# Patient Record
Sex: FEMALE
[De-identification: names, ages, dates, MRNs, and addresses within clinical notes are randomized per-mention and may not be internally consistent; named-entity substitution may affect disease eponyms.]

---

## 2018-10-24 ENCOUNTER — HOSPITAL ENCOUNTER (EMERGENCY)
Dept: HOSPITAL 14 - H.ER | Age: 48
Discharge: HOME | End: 2018-10-24
Payer: COMMERCIAL

## 2018-10-24 VITALS — OXYGEN SATURATION: 98 % | TEMPERATURE: 97.9 F

## 2018-10-24 VITALS — SYSTOLIC BLOOD PRESSURE: 118 MMHG | DIASTOLIC BLOOD PRESSURE: 90 MMHG | HEART RATE: 70 BPM | RESPIRATION RATE: 17 BRPM

## 2018-10-24 DIAGNOSIS — M19.071: Primary | ICD-10-CM

## 2018-10-24 DIAGNOSIS — M19.021: ICD-10-CM

## 2018-10-24 LAB
BASOPHILS # BLD AUTO: 0 K/UL (ref 0–0.2)
BASOPHILS NFR BLD: 0.4 % (ref 0–2)
BUN SERPL-MCNC: 17 MG/DL (ref 7–17)
CALCIUM SERPL-MCNC: 8.9 MG/DL (ref 8.4–10.2)
EOSINOPHIL # BLD AUTO: 0.2 K/UL (ref 0–0.7)
EOSINOPHIL NFR BLD: 2.1 % (ref 0–4)
ERYTHROCYTE [DISTWIDTH] IN BLOOD BY AUTOMATED COUNT: 16.7 % (ref 11.5–14.5)
GFR NON-AFRICAN AMERICAN: > 60
HGB BLD-MCNC: 9.9 G/DL (ref 12–16)
LYMPHOCYTES # BLD AUTO: 1.4 K/UL (ref 1–4.3)
LYMPHOCYTES NFR BLD AUTO: 14.1 % (ref 20–40)
MCH RBC QN AUTO: 22 PG (ref 27–31)
MCHC RBC AUTO-ENTMCNC: 32 G/DL (ref 33–37)
MCV RBC AUTO: 68.9 FL (ref 81–99)
MONOCYTES # BLD: 0.9 K/UL (ref 0–0.8)
MONOCYTES NFR BLD: 9.3 % (ref 0–10)
NEUTROPHILS # BLD: 7.5 K/UL (ref 1.8–7)
NEUTROPHILS NFR BLD AUTO: 74.1 % (ref 50–75)
NRBC BLD AUTO-RTO: 0.1 % (ref 0–0)
PLATELET # BLD: 273 K/UL (ref 130–400)
PMV BLD AUTO: 8 FL (ref 7.2–11.7)
RBC # BLD AUTO: 4.5 MIL/UL (ref 3.8–5.2)
WBC # BLD AUTO: 10.1 K/UL (ref 4.8–10.8)

## 2018-10-24 PROCEDURE — 73080 X-RAY EXAM OF ELBOW: CPT

## 2018-10-24 PROCEDURE — 99283 EMERGENCY DEPT VISIT LOW MDM: CPT

## 2018-10-24 PROCEDURE — 80048 BASIC METABOLIC PNL TOTAL CA: CPT

## 2018-10-24 PROCEDURE — 85025 COMPLETE CBC W/AUTO DIFF WBC: CPT

## 2018-10-24 PROCEDURE — 81025 URINE PREGNANCY TEST: CPT

## 2018-10-24 NOTE — ED PDOC
Upper Extremity Pain/Injury


Time Seen by Provider: 10/24/18 07:07


Chief Complaint (Nursing): Upper Extremity Problem/Injury


History Per: Patient (This  49 yo female states onset of severe right elbow pain

last night while she was doing dishes as usual. Patient denies fall, blunt 

injury, overexertion or other injuries. She denies other medical problems. She 

took ibuprofen 400mg last night without significant relief.)


History/Exam Limitations: no limitations





Past Medical History


Reviewed: Historical Data, Nursing Documentation, Vital Signs


Vital Signs: 


                                Last Vital Signs











Temp  97.9 F   10/24/18 06:49


 


Pulse  70   10/24/18 11:30


 


Resp  17   10/24/18 11:30


 


BP  118/90   10/24/18 11:30


 


Pulse Ox  98   10/24/18 11:30














- Medical History


PMH: Anemia





- Surgical History


Surgical History: No Surg Hx





- Family History


Family History: States: No Known Family Hx





- Living Arrangements


Living Arrangements: With Family





- Social History


Current smoker - smoking cessation education provided: No





- Home Medications


Home Medications: 


                                Ambulatory Orders











 Medication  Instructions  Recorded


 


Naproxen [Naprosyn] 500 mg PO BID PRN #20 tablet 10/24/18














- Allergies


Allergies/Adverse Reactions: 


                                    Allergies











Allergy/AdvReac Type Severity Reaction Status Date / Time


 


pollen extracts Allergy  CONGESTION Verified 10/24/18 06:48














Review of Systems


ROS Statement: Except As Marked, All Systems Reviewed And Found Negative


Constitutional: Negative for: Fever, Chills


Cardiovascular: Negative for: Chest Pain


Respiratory: Negative for: Shortness of Breath


Gastrointestinal: Negative for: Nausea, Vomiting


Genitourinary Female: Negative for: Dysuria





Physical Exam





- Reviewed


Nursing Documentation Reviewed: Yes


Vital Signs Reviewed: Yes





- Physical Exam


Appears: Positive for: Well, No Acute Distress


Head Exam: Positive for: ATRAUMATIC, NORMAL INSPECTION, NORMOCEPHALIC


Skin: Positive for: Normal Color, Warm, DRY


Eye Exam: Positive for: Normal appearance


ENT: Positive for: Normal ENT Inspection


Neck: Positive for: Normal


Cardiovascular/Chest: Positive for: Regular Rate, Rhythm


Respiratory: Positive for: CNT, Normal Breath Sounds


Gastrointestinal/Abdominal: Positive for: Normal Exam, Soft


Back: Positive for: Normal Inspection


Extremity: Positive for: Normal ROM, Tenderness (along the elbow joint, 

especially along the lateral and and medial epicondyles.)


Neurologic/Psych: Positive for: Alert, Oriented





- Laboratory Results


Result Diagrams: 


                                 10/24/18 08:10





                                 10/24/18 08:10





- ECG


O2 Sat by Pulse Oximetry: 98





Disposition





- Clinical Impression


Clinical Impression: 


 Osteoarthrosis of right elbow








- Patient ED Disposition


Is Patient to be Admitted: No


Doctor Will See Patient In The: Office


Counseled Patient/Family Regarding: Diagnosis, Need For Followup, Rx Given





- Disposition


Referrals: 


[facility] [Outside]


Disposition: Routine/Home


Disposition Time: 10:45


Condition: IMPROVED


Prescriptions: 


Naproxen [Naprosyn] 500 mg PO BID PRN #20 tablet


 PRN Reason: Pain, Moderate (4-7)


Instructions:  Osteoarthritis


Forms:  [employer] [employer] [facility] (English), [facility] ED School/Work Excuse


Print Language: Wolof





- POA


Present On Arrival: None

## 2018-10-24 NOTE — ED PDOC
Upper Extremity Pain/Injury


Time Seen by Provider: 10/24/18 07:07


Chief Complaint (Nursing): Upper Extremity Problem/Injury





Past Medical History


Vital Signs: 


                                Last Vital Signs











Temp  97.9 F   10/24/18 06:49


 


Pulse  67   10/24/18 06:49


 


Resp  18   10/24/18 06:49


 


BP  121/78   10/24/18 06:49


 


Pulse Ox  98   10/24/18 06:49














- Family History


Family History: States: No Known Family Hx





- Home Medications


Home Medications: 


                                Ambulatory Orders











 Medication  Instructions  Recorded


 


Naproxen [Naprosyn] 500 mg PO BID PRN #20 tablet 10/24/18














- Allergies


Allergies/Adverse Reactions: 


                                    Allergies











Allergy/AdvReac Type Severity Reaction Status Date / Time


 


pollen extracts Allergy  CONGESTION Verified 10/24/18 06:48














- Laboratory Results


Result Diagrams: 


                                 10/24/18 08:10





                                 10/24/18 08:10





- ECG


O2 Sat by Pulse Oximetry: 98





Disposition





- Clinical Impression


Clinical Impression: 


 Osteoarthrosis of right elbow








- Patient ED Disposition


Is Patient to be Admitted: No


Doctor Will See Patient In The: Office


Counseled Patient/Family Regarding: Diagnosis, Need For Followup, Rx Given





- Disposition


Referrals: 


[facility] [Outside]


Disposition: Routine/Home


Disposition Time: 10:45


Condition: IMPROVED


Prescriptions: 


Naproxen [Naprosyn] 500 mg PO BID PRN #20 tablet


 PRN Reason: Pain, Moderate (4-7)


Instructions:  Osteoarthritis


Forms:  [employer] [facility] (English), [facility] ED School/Work Excuse


Print Language: Kazakh





- POA


Present On Arrival: None

## 2018-10-24 NOTE — RAD
Date of service: 



10/24/2018



PROCEDURE:  Radiographs of the right elbow.



HISTORY:

 sudden onset of pain last night. no injury. 



COMPARISON:

No prior.



FINDINGS:



BONES:

Normal. No fracture.



JOINTS:

Trace osteoarthritis ulnar coronoid  spurring.



SOFT TISSUES:

Normal.



JOINT EFFUSION:

None.



OTHER FINDINGS:

None.



IMPRESSION:

Trace osteoarthrosis-ulnar coronoid spurring.  No fracture or lytic 

lesion.

## 2018-12-22 ENCOUNTER — HOSPITAL ENCOUNTER (EMERGENCY)
Dept: HOSPITAL 14 - H.ER | Age: 48
Discharge: HOME | End: 2018-12-22
Payer: SELF-PAY

## 2018-12-22 VITALS
DIASTOLIC BLOOD PRESSURE: 82 MMHG | OXYGEN SATURATION: 96 % | RESPIRATION RATE: 17 BRPM | SYSTOLIC BLOOD PRESSURE: 130 MMHG | TEMPERATURE: 99.4 F | HEART RATE: 90 BPM

## 2018-12-22 DIAGNOSIS — M75.01: Primary | ICD-10-CM

## 2018-12-22 PROCEDURE — 99284 EMERGENCY DEPT VISIT MOD MDM: CPT

## 2018-12-22 PROCEDURE — 96372 THER/PROPH/DIAG INJ SC/IM: CPT

## 2018-12-22 PROCEDURE — 81025 URINE PREGNANCY TEST: CPT

## 2018-12-22 PROCEDURE — 73030 X-RAY EXAM OF SHOULDER: CPT

## 2018-12-22 NOTE — ED PDOC
Upper Extremity Pain/Injury


Time Seen by Provider: 12/22/18 02:30


Chief Complaint (Nursing): Upper Extremity Problem/Injury


Chief Complaint (Provider): Upper Extremity Problem/Injury


History Per: Patient


History/Exam Limitations: no limitations


Onset/Duration Of Symptoms: Days (x1)


Current Symptoms Are (Timing): Still Present


Additional Complaint(s): 


48 year old  female presents to ED with right shoulder pain that 

exacerbates with movement status post house cleaning. Patient states she had 

similar symptoms in the past then diagnosed with arthritis at the time. She took

Tylenol with minimal relief. Otherwise, patient denies any falls, blunt trauma 

or injury to affected area. 





PCP: [facility]





Past Medical History


Reviewed: Historical Data, Nursing Documentation, Vital Signs


Vital Signs: 


                                Last Vital Signs











Temp  99.4 F   12/22/18 02:06


 


Pulse  90   12/22/18 02:06


 


Resp  17   12/22/18 02:06


 


BP  130/82   12/22/18 02:06


 


Pulse Ox  96   12/22/18 02:06














- Medical History


PMH: Anemia





- Family History


Family History: States: Unknown Family Hx





- Home Medications


Home Medications: 


                                Ambulatory Orders











 Medication  Instructions  Recorded


 


Naproxen [Naprosyn] 500 mg PO BID PRN #20 tablet 10/24/18


 


Naproxen [Naprosyn] 500 mg PO Q12 #14 tab 12/22/18














- Allergies


Allergies/Adverse Reactions: 


                                    Allergies











Allergy/AdvReac Type Severity Reaction Status Date / Time


 


pollen extracts Allergy  CONGESTION Verified 10/24/18 06:48














Review of Systems


ROS Statement: Except As Marked, All Systems Reviewed And Found Negative


Musculoskeletal: Positive for: Shoulder Pain (right-sided).  Negative for: Other

 (blunt trauma or injury)





Physical Exam





- Reviewed


Nursing Documentation Reviewed: Yes


Vital Signs Reviewed: Yes





- Physical Exam


Appears: Positive for: Non-toxic, No Acute Distress


Extremity: Positive for: Tenderness (over right bicep tendon and AC joint with 

passive ROM).  Negative for: Deformity (or warmth/erythema of right shoulder)


Neurologic/Psych: Positive for: Alert, Oriented.  Negative for: Motor/Sensory 

Deficits





- Laboratory Results


Urine Pregnancy POC: Negative





- ECG


O2 Sat by Pulse Oximetry: 96 (RA)


Pulse Ox Interpretation: Normal





Medical Decision Making


Medical Decision Making: 


Initial Impression: 48 year old  female with frozen shoulder.


Initial Plan:


* Urine pregnancy


* Toradol 60mg IM


* XR shoulder (right)





Time: 0323


--XR right shoulder interpreted by provider: (-) dislocation or fracture.





Time: 0415


--Upon provider reevaluation, patient is medically stable and requires no 

further treatment in the ED at this time. Patient will be discharged home with 

Rx for Naprosyn. Counseling was provided and all questions were answered 

regarding diagnosis. There is agreement to discharge plan. Return if symptoms 

persist or worsen.





Clinical Impression: Frozen shoulder





 

--------------------------------------------------------------------------------


-----------------


Scribe Attestation:


Documented by Melisa Olea, acting as a scribe for Javed Fletcher MD.


Provider Scribe Attestation:


All medical record entries made by the Scribe were at my direction and 

personally dictated by me. I have reviewed the chart and agree that the record 

accurately reflects my personal performance of the history, physical exam, 

medical decision making, and the department course for this patient. I have also

 personally directed, reviewed, and agree with the discharge instructions and 

disposition.





Disposition





- Clinical Impression


Clinical Impression: 


 Frozen shoulder








- Patient ED Disposition


Is Patient to be Admitted: No


Counseled Patient/Family Regarding: Studies Performed, Diagnosis, Rx Given





- Disposition


Disposition: Routine/Home


Disposition Time: 04:15


Condition: STABLE


Prescriptions: 


Naproxen [Naprosyn] 500 mg PO Q12 #14 tab


Instructions:  Frozen Shoulder, Frozen Shoulder Exercises


Forms:  [employer] (English)


Print Language: Singaporean

## 2018-12-22 NOTE — RAD
Date of service: 



12/22/2018



PROCEDURE:  Radiographs of the Right Shoulder



HISTORY:

r/o fracture







COMPARISON:

No prior.



FINDINGS:



BONES:

Normal. No fracture.



JOINTS:

Glenohumeral and acromioclavicular joints preserved. No significant 

osteoarthritis.



SOFT TISSUES:

Normal.



OTHER FINDINGS:

None. 



IMPRESSION:

No evidence of acute displaced fracture nor dislocation

## 2019-01-13 ENCOUNTER — HOSPITAL ENCOUNTER (EMERGENCY)
Dept: HOSPITAL 14 - H.ER | Age: 49
LOS: 1 days | Discharge: HOME | End: 2019-01-14
Payer: SELF-PAY

## 2019-01-13 VITALS — RESPIRATION RATE: 18 BRPM

## 2019-01-13 DIAGNOSIS — M25.511: ICD-10-CM

## 2019-01-13 DIAGNOSIS — R22.31: Primary | ICD-10-CM

## 2019-01-13 LAB
BASOPHILS # BLD AUTO: 0 K/UL (ref 0–0.2)
BASOPHILS NFR BLD: 0.6 % (ref 0–2)
BUN SERPL-MCNC: 16 MG/DL (ref 7–17)
CALCIUM SERPL-MCNC: 8.8 MG/DL (ref 8.4–10.2)
EOSINOPHIL # BLD AUTO: 0.2 K/UL (ref 0–0.7)
EOSINOPHIL NFR BLD: 2.4 % (ref 0–4)
ERYTHROCYTE [DISTWIDTH] IN BLOOD BY AUTOMATED COUNT: 18.7 % (ref 11.5–14.5)
GFR NON-AFRICAN AMERICAN: > 60
HGB BLD-MCNC: 9.7 G/DL (ref 12–16)
LYMPHOCYTES # BLD AUTO: 1.9 K/UL (ref 1–4.3)
LYMPHOCYTES NFR BLD AUTO: 29.4 % (ref 20–40)
MCH RBC QN AUTO: 21.5 PG (ref 27–31)
MCHC RBC AUTO-ENTMCNC: 31.6 G/DL (ref 33–37)
MCV RBC AUTO: 68.2 FL (ref 81–99)
MONOCYTES # BLD: 0.6 K/UL (ref 0–0.8)
MONOCYTES NFR BLD: 9.4 % (ref 0–10)
NEUTROPHILS # BLD: 3.8 K/UL (ref 1.8–7)
NEUTROPHILS NFR BLD AUTO: 58.2 % (ref 50–75)
NRBC BLD AUTO-RTO: 0 % (ref 0–0)
PLATELET # BLD: 241 K/UL (ref 130–400)
PMV BLD AUTO: 8.3 FL (ref 7.2–11.7)
RBC # BLD AUTO: 4.51 MIL/UL (ref 3.8–5.2)
WBC # BLD AUTO: 6.5 K/UL (ref 4.8–10.8)

## 2019-01-13 PROCEDURE — 93971 EXTREMITY STUDY: CPT

## 2019-01-13 PROCEDURE — 99283 EMERGENCY DEPT VISIT LOW MDM: CPT

## 2019-01-13 PROCEDURE — 80048 BASIC METABOLIC PNL TOTAL CA: CPT

## 2019-01-13 PROCEDURE — 85025 COMPLETE CBC W/AUTO DIFF WBC: CPT

## 2019-01-13 PROCEDURE — 71275 CT ANGIOGRAPHY CHEST: CPT

## 2019-01-13 PROCEDURE — 81025 URINE PREGNANCY TEST: CPT

## 2019-01-13 PROCEDURE — 85378 FIBRIN DEGRADE SEMIQUANT: CPT

## 2019-01-13 NOTE — ED PDOC
Upper Extremity Pain/Injury


Time Seen by Provider: 01/13/19 20:55


Chief Complaint (Nursing): Finger,Hand,&Wrist


Chief Complaint (Provider): Upper Extremity Pain/Injury


History Per: Patient


History/Exam Limitations: no limitations


Onset/Duration Of Symptoms: Days (x5)


Current Symptoms Are (Timing): Still Present


Additional Complaint(s): 


49 y/o female with no significant PMHx presents to the ED for evaluation of 

right arm and hand swelling and pain, onset 5 days ago. Patient states she has 

began noticing pain and swelling has been increasingly worsening over the last 

five days thus prompting today's visit. Patient reports of taking Tylenol with 

minimal improvement. Patient states last dose of Tylenol was at 5:00 PM. 

Otherwise, patient denies trauma, fall, shortness of breath and any history of 

Hypercoagulability, DVT or PE in the past. 








PMD: Renae Cervantes





Past Medical History


Reviewed: Historical Data, Nursing Documentation, Vital Signs


Vital Signs: 





                                Last Vital Signs











Temp  97.9 F   01/13/19 20:49


 


Pulse  66   01/13/19 20:49


 


Resp  18   01/13/19 20:49


 


BP  123/76   01/13/19 20:49


 


Pulse Ox  99   01/13/19 20:49














- Medical History


PMH: Anemia





- Surgical History


Surgical History: No Surg Hx





- Family History


Family History: States: Unknown Family Hx





- Home Medications


Home Medications: 


                                Ambulatory Orders











 Medication  Instructions  Recorded


 


Naproxen [Naprosyn] 500 mg PO BID PRN #20 tablet 10/24/18


 


Naproxen [Naprosyn] 500 mg PO Q12 #14 tab 12/22/18














- Allergies


Allergies/Adverse Reactions: 


                                    Allergies











Allergy/AdvReac Type Severity Reaction Status Date / Time


 


pollen extracts Allergy  CONGESTION Verified 10/24/18 06:48














Review of Systems


ROS Statement: Except As Marked, All Systems Reviewed And Found Negative


Cardiovascular: Negative for: Chest Pain


Respiratory: Negative for: Shortness of Breath


Musculoskeletal: Positive for: Arm Pain (right), Hand Pain (right)





Physical Exam





- Reviewed


Nursing Documentation Reviewed: Yes


Vital Signs Reviewed: Yes





- Physical Exam


Appears: Positive for: No Acute Distress


Cardiovascular/Chest: Positive for: Regular Rate, Rhythm.  Negative for: Murmur


Respiratory: Positive for: Normal Breath Sounds.  Negative for: Respiratory 

Distress


Pulses-Radial (L): 2+


Pulses-Radial (R): 2+


Extremity: Positive for: Capillary Refill (< 3 seconds), Swelling (from the 

right mid-forearm to the hand.).  Negative for: Normal ROM (Decreased flexion of

 all five digits. Mildly decreased flexion at the wrists), Tenderness (on the 

forearm or hand), Deformity, Other (ecchymosis, erythema )


Neurologic/Psych: Positive for: Alert, Oriented.  Negative for: Motor/Sensory 

Deficits





- Laboratory Results


Result Diagrams: 


                                 01/13/19 21:33





                                 01/13/19 21:33





- ECG


O2 Sat by Pulse Oximetry: 99 (RA)


Pulse Ox Interpretation: Normal





Medical Decision Making


Medical Decision Making: 


Time: 2125


Plan:


-- BMP


-- CBC with Differentials


-- D Dimer


-- Duplex Upper Extremity Vein Right US 





Right Upper extremity venous duplex: No evidence of DVT. 


D-dimer: 683





Given significantly elevated D-dimer, pt to be ruled out for PE. 





Pt endorsed to ANNELISE Gomez pending chest CTA and re-evaluation. 





 

________________________________________________________________________________


___


Scribe Attestation:


Documented by Casie Arciniega, acting as a scribe Angeles Cooney PA-C.





Provider Scribe Attestation:


All medical record entries made by the Scribe were at my direction and 

personally dictated by me. I have reviewed the chart and agree that the record 

accurately reflects my personal performance of the history, physical exam, 

medical decision making, and the department course for this patient. I have also

 personally directed, reviewed, and agree with the discharge instructions and 

disposition.





Disposition





- Clinical Impression


Clinical Impression: 


 Swelling of right hand








- Patient ED Disposition


Is Patient to be Admitted: Transfer of Care





- Disposition


Disposition: Transfer of Care (ANNELISE Gomez)


Disposition Time: 00:30


Condition: FAIR


Forms:  [employer] (English)

## 2019-01-14 VITALS
TEMPERATURE: 98.6 F | DIASTOLIC BLOOD PRESSURE: 81 MMHG | HEART RATE: 65 BPM | SYSTOLIC BLOOD PRESSURE: 126 MMHG | OXYGEN SATURATION: 98 %

## 2019-01-14 NOTE — CT
Date of service: 



01/14/2019



PROCEDURE:  CT Chest with contrast (Pulmonary Angiogram)



HISTORY:

elevated D-dimer, Right arm swelling



COMPARISON:

None available.



TECHNIQUE:

Axial computed tomography images were obtained of the chest in the 

pulmonary arterial phase of enhancement. Coronal and sagittal 

reformatted images were created and reviewed.



Intravenous contrast dose: Visipaque 320, 95 cc



Radiation dose:



Total exam DLP = 321.42 mGy-cm.



This CT exam was performed using one or more of the following dose 

reduction techniques: Automated exposure control, adjustment of the 

mA and/or kV according to patient size, and/or use of iterative 

reconstruction technique.



FINDINGS:



PULMONARY ARTERIES:

Unremarkable. No pulmonary embolism. 



AORTA:

No acute findings. No thoracic aortic aneurysm. No aortic 

atherosclerotic calcification or mural plaque present.



LUNGS:

Limited ground-glass opacity bilateral bases, minimal.  No nodule, 

mass or pulmonary consolidation. 



PLEURAL SPACES:

Unremarkable. No effusion or pneumothorax. 



HEART:

Mild cardiomegaly.  No pulmonary vascular congestion.  No significant 

pericardial effusion. 



LYMPH NODES:

No lymphadenopathy.



BONES, CHEST WALL:

Unremarkable. No fracture or destructive lesion 



OTHER FINDINGS:

Unremarkable. 



IMPRESSION:

Unremarkable CT pulmonary angiogram. No pulmonary embolus.  Minimal 

ground-glass opacity bilateral lower lobe bases.  Mild cardiomegaly.  

No pulmonary vascular congestion evident.



[facility]cord[facility] preliminary report from [facility], 01/14/2019 2:50 a.m..

## 2019-01-14 NOTE — US
Date of service: 



01/13/2019



PROCEDURE:  RIGHT UPPER EXTREMITY VENOUS DOPPLER ULTRASOUND



HISTORY:

unilateral Right arm and hand swelling x 5 days



COMPARISON:

None available.



TECHNIQUE:

Duplex Doppler ultrasonography of the major deep veins of the right 

upper extremity is been performed including graded compression and 

augmentation where possible.



FINDINGS:

Good compressibility and augmentation are identified at the right 

subclavian, axillary, brachial and cephalic as well as basilic veins. 

 Robust color Doppler blood flow is seen the right internal jugular 

vein which compresses well.  Normal phasic spectral patterns are 

appreciate throughout the veins of the right upper extremity further. 



IMPRESSION:

No sonographic evidence to suggest deep venous thrombosis right upper 

extremity.



[facility] preliminary report from [facility], 01/14/2019 12:31 a.m..

## 2019-01-14 NOTE — ED PDOC
- Laboratory Results


Result Diagrams: 


                                 01/13/19 21:33





                                 01/13/19 21:33


Lab Results: 





                                        











D-Dimer, Quantitative  685 ng/mlDDU (0-230)  H  01/13/19  21:33    














- ECG


O2 Sat by Pulse Oximetry: 99 (RA)


Pulse Ox Interpretation: Normal





Medical Decision Making


Medical Decision Making: 





Case endorsed to writer, Patricia MONTANO, at 0015. Pertinent details reviewed. 

Patient pending CTA evaluation to r/o PE. Labs reviewed, significant elevation 

of D-Dimer. Upper extremity U/S: no evidence of thrombosis as read per [facility].





0155


Patient in CT.





0250


CT Angio Chest FINDINGS:  


Minimal groundglass densities of the left lower lobe.


Normal enhancement of the main pulmonary artery and right and left pulmonary 

arteries.  Normal enhancement of the bilateral peripheral pulmonary arteries.  

There is no demonstrated pulmonary embolism.     


Normal thoracic aorta and visualized great vessels.  


There is no demonstrated aortic dissection.


Normal heart and pericardium.      


Normal mediastinum.  Normal hilar regions. 


Normal visualized trachea and bronchi.  The lungs are well expanded. 


Normal pulmonary parenchyma.  


Normal pleura.      


Normal chest wall structures.      


Normal osseous structures.


Normal visualized upper abdomen.  


IMPRESSION:


No demonstrated pulmonary embolism or arterial dissection.


Minimal groundglass densities of the left lower lobe. Subsegmental atelectasis 

versus developing pneumonia.





0300


Case discussed with ED MD Abraham who states patient is appropriate for discharge 

and outpatient follow up with PMD.


On re-evaluation, patient requesting additional pain medication before 

discharge. Tylenol 975mg PO ordered. Vitals stable. 


Lab/Diagnostic results d/w the patient in great detail. Diagnosis of vascular 

congestion of upper extremity d/w the patient. 


Based on history, exam and diagnostic results, plan will be for outpatient 

follow up. 


Patient instructed to follow-up with pmd / referral provided / the clinic  in 1-

2 days without fail. Advised to take medication as prescribed. Return to the 

emergency room at any time for any new or worsening symptoms. Patient states she

fully agrees with and understands discharge instructions. States that she agrees

with the plan and disposition. Verbalized and repeated discharge instructions 

and plan. I have given the patient opportunity to ask any additional questions.





Disposition


Counseled Patient/Family Regarding: Studies Performed, Diagnosis, Need For 

Followup, Rx Given





- Clinical Impression


Clinical Impression: 


 Swelling of right hand, Swelling of upper arm








- POA


Present On Arrival: None





- Disposition


Referrals: 


[facility] [Outside]


Disposition: Routine/Home


Disposition Time: 03:00


Condition: FAIR


Additional Instructions: 


La atencin mdica de emergencia que recibi hoy se dirigi a jessie sntomas 

agudos. Si le recetaron algn medicamento, llnelo y tmelo segn las 

indicaciones. Los sntomas pueden tardar varios martins en resolverse. Regrese al 

Departamento de Emergencias si jessie sntomas empeoran, no mejoran o si tiene 

otros problemas.





Comunquese con solano mdico dentro de 2 martins para gloria nueva evaluacin y adrianna un 

seguimiento o llame a ileana de los mdicos / clnicas a los que ha sido referido y

 que figuran en el formulario de Informacin de visita al paciente que se 

incluye en solano paquete de jeff. Lleve todos los documentos que le entregaron al 

momento del jeff junto con todos los medicamentos que est tomando para solano 

visita de seguimiento. Nuestro tratamiento no puede reemplazar la atencin 

mdica continua por parte de un proveedor de atencin primaria (PCP) fuera del 

departamento de emergencias.


Prescriptions: 


Acetaminophen [Acetaminophen 8 Hour] 650 mg PO Q8 PRN #21 tablet.er


 PRN Reason: Pain, Moderate (4-7)


Meloxicam [Mobic] 15 mg PO DAILY #10 tab


Instructions:  Dependent Edema (DC), Swelling


Forms:  [employer] (K[employer])


Print Language: Pakistani





Results





- Lab Results


Lab Results: 

















  01/13/19 01/13/19 01/13/19





  21:33 21:33 21:33


 


WBC   6.5 


 


RBC   4.51 


 


Hgb   9.7 L 


 


Hct   30.7 L 


 


MCV   68.2 L 


 


MCH   21.5 L 


 


MCHC   31.6 L 


 


RDW   18.7 H 


 


Plt Count   241 


 


MPV   8.3 


 


Neut % (Auto)   58.2 


 


Lymph % (Auto)   29.4 


 


Mono % (Auto)   9.4 


 


Eos % (Auto)   2.4 


 


Baso % (Auto)   0.6 


 


Neut # (Auto)   3.8 


 


Lymph # (Auto)   1.9 


 


Mono # (Auto)   0.6 


 


Eos # (Auto)   0.2 


 


Baso # (Auto)   0.0 


 


D-Dimer, Quantitative    685 H


 


Sodium  139  


 


Potassium  3.8  


 


Chloride  104  


 


Carbon Dioxide  28  


 


Anion Gap  11  


 


BUN  16  


 


Creatinine  0.7  


 


Est GFR ( Amer)  > 60  


 


Est GFR (Non-Af Amer)  > 60  


 


Random Glucose  102  


 


Calcium  8.8

## 2019-01-27 ENCOUNTER — HOSPITAL ENCOUNTER (EMERGENCY)
Dept: HOSPITAL 14 - H.ER | Age: 49
Discharge: HOME | End: 2019-01-27
Payer: SELF-PAY

## 2019-01-27 VITALS
RESPIRATION RATE: 18 BRPM | OXYGEN SATURATION: 99 % | SYSTOLIC BLOOD PRESSURE: 136 MMHG | HEART RATE: 91 BPM | DIASTOLIC BLOOD PRESSURE: 76 MMHG | TEMPERATURE: 98.5 F

## 2019-01-27 DIAGNOSIS — R10.12: ICD-10-CM

## 2019-01-27 DIAGNOSIS — M54.2: ICD-10-CM

## 2019-01-27 DIAGNOSIS — M25.512: Primary | ICD-10-CM

## 2019-01-27 LAB
ALBUMIN SERPL-MCNC: 4.3 G/DL (ref 3.5–5)
ALBUMIN/GLOB SERPL: 1.2 {RATIO} (ref 1–2.1)
ALT SERPL-CCNC: 26 U/L (ref 9–52)
AST SERPL-CCNC: 29 U/L (ref 14–36)
BASOPHILS # BLD AUTO: 0.1 K/UL (ref 0–0.2)
BASOPHILS NFR BLD: 0.6 % (ref 0–2)
BUN SERPL-MCNC: 12 MG/DL (ref 7–17)
CALCIUM SERPL-MCNC: 8.9 MG/DL (ref 8.4–10.2)
EOSINOPHIL # BLD AUTO: 0.1 K/UL (ref 0–0.7)
EOSINOPHIL NFR BLD: 0.6 % (ref 0–4)
ERYTHROCYTE [DISTWIDTH] IN BLOOD BY AUTOMATED COUNT: 19.6 % (ref 11.5–14.5)
GFR NON-AFRICAN AMERICAN: > 60
HGB BLD-MCNC: 10.2 G/DL (ref 12–16)
LIPASE SERPL-CCNC: 62 U/L (ref 23–300)
LYMPHOCYTES # BLD AUTO: 1.3 K/UL (ref 1–4.3)
LYMPHOCYTES NFR BLD AUTO: 12.9 % (ref 20–40)
MCH RBC QN AUTO: 21.6 PG (ref 27–31)
MCHC RBC AUTO-ENTMCNC: 31.6 G/DL (ref 33–37)
MCV RBC AUTO: 68.3 FL (ref 81–99)
MONOCYTES # BLD: 0.8 K/UL (ref 0–0.8)
MONOCYTES NFR BLD: 8.3 % (ref 0–10)
NEUTROPHILS # BLD: 7.7 K/UL (ref 1.8–7)
NEUTROPHILS NFR BLD AUTO: 77.6 % (ref 50–75)
NRBC BLD AUTO-RTO: 0 % (ref 0–0)
PLATELET # BLD: 247 K/UL (ref 130–400)
PMV BLD AUTO: 7.8 FL (ref 7.2–11.7)
RBC # BLD AUTO: 4.73 MIL/UL (ref 3.8–5.2)
WBC # BLD AUTO: 9.9 K/UL (ref 4.8–10.8)

## 2019-01-27 PROCEDURE — 83690 ASSAY OF LIPASE: CPT

## 2019-01-27 PROCEDURE — 93005 ELECTROCARDIOGRAM TRACING: CPT

## 2019-01-27 PROCEDURE — 85025 COMPLETE CBC W/AUTO DIFF WBC: CPT

## 2019-01-27 PROCEDURE — 81025 URINE PREGNANCY TEST: CPT

## 2019-01-27 PROCEDURE — 80053 COMPREHEN METABOLIC PANEL: CPT

## 2019-01-27 PROCEDURE — 96374 THER/PROPH/DIAG INJ IV PUSH: CPT

## 2019-01-27 PROCEDURE — 99282 EMERGENCY DEPT VISIT SF MDM: CPT

## 2019-01-27 PROCEDURE — 84484 ASSAY OF TROPONIN QUANT: CPT

## 2019-01-27 NOTE — ED PDOC
Upper Extremity Pain/Injury


Time Seen by Provider: 01/27/19 04:46


Chief Complaint (Nursing): Upper Extremity Problem/Injury


Chief Complaint (Provider): Upper Extremity Problem/Injury


History Per: Patient


History/Exam Limitations: no limitations


Onset/Duration Of Symptoms: Days (x2 days)


Additional Complaint(s): 





Sarahi Farmer is a 48 year old female with no past medical history, who presents to

the emergency department complaining of having shoulder and neck pain, 

associated with LUQ abdominal pain and vomiting, onset x2 days. Patient states 

that her neck and shoulder pain is worse with movement. She denies having any 

fever, diarrhea, chest pain or difficulty breathing. 





PMD: Dr. cisneros in [facility]











Past Medical History


Reviewed: Historical Data, Nursing Documentation, Vital Signs


Vital Signs: 





                                Last Vital Signs











Temp  98.5 F   01/27/19 03:58


 


Pulse  91 H  01/27/19 03:58


 


Resp  18   01/27/19 03:58


 


BP  136/76   01/27/19 03:58


 


Pulse Ox  99   01/27/19 03:58














- Medical History


PMH: Anemia





- Surgical History


Other surgeries: tubal ligation





- Family History


Family History: States: Unknown Family Hx





- Home Medications


Home Medications: 


                                Ambulatory Orders











 Medication  Instructions  Recorded


 


Naproxen [Naprosyn] 500 mg PO BID PRN #20 tablet 10/24/18


 


Acetaminophen [Acetaminophen 8 650 mg PO Q8 PRN #21 tablet.er 01/14/19





Hour]  


 


Meloxicam [Mobic] 15 mg PO DAILY #10 tab 01/14/19


 


Cyclobenzaprine [Cyclobenzaprine 10 mg PO TID PRN #15 tab 01/27/19





HCl]  


 


Naproxen [Naprosyn] 500 mg PO Q12 #20 tab 01/27/19














- Allergies


Allergies/Adverse Reactions: 


                                    Allergies











Allergy/AdvReac Type Severity Reaction Status Date / Time


 


pollen extracts Allergy  CONGESTION Verified 01/27/19 04:32














Review of Systems


ROS Statement: Except As Marked, All Systems Reviewed And Found Negative


Constitutional: Negative for: Fever


Cardiovascular: Negative for: Chest Pain


Respiratory: Negative for: Shortness of Breath


Gastrointestinal: Positive for: Vomiting, Abdominal Pain


Musculoskeletal: Positive for: Neck Pain, Shoulder Pain





Physical Exam





- Reviewed


Nursing Documentation Reviewed: Yes


Vital Signs Reviewed: Yes





- Physical Exam


Appears: Positive for: No Acute Distress


Head Exam: Positive for: ATRAUMATIC, NORMOCEPHALIC


Skin: Positive for: Normal Color, Warm, Dry


Neck: Positive for: Limited ROM (due to pain), Pain On Movement Of Neck.  

Negative for: Normal (tenderness to left lateral neck and shoulder )


Cardiovascular/Chest: Positive for: Regular Rate, Rhythm


Respiratory: Positive for: Normal Breath Sounds.  Negative for: Respiratory 

Distress


Gastrointestinal/Abdominal: Positive for: Normal Exam, Soft.  Negative for: 

Tenderness


Extremity: Negative for: Normal ROM (Limited ROM secondary to pain at left 

shoulder), Deformity, Swelling


Neurologic/Psych: Positive for: Alert, Oriented





- Laboratory Results


Result Diagrams: 


                                 01/27/19 05:08





                                 01/27/19 05:08





- ECG


O2 Sat by Pulse Oximetry: 99 (RA)


Pulse Ox Interpretation: Normal





- Progress


Re-evaluation Time: 06:16


Condition: Re-examined, Improved





Medical Decision Making


Medical Decision Making: 





Time: 0454


Impression: vomiting, musculoskeletal pain, atypical ACS; less likely 

gallbladder disease or pancreatitis. 


Plan: 





--EKG


--CMP


--Lipase 


--Troponin I 


--CBC with differential


--Flexeril 10 mg PO


--Toradol 30 mg IM 





 

--------------------------------------------------------------------------------


-----------------





Scribe Attestation:


Documented by Anthony Barr, acting as a scribe for Antione Sheth MD. 





Provider Scribe Attestation:


All medical record entries made by the Scribe were at my direction and 

personally dictated by me. I have reviewed the chart and agree that the record 

accurately reflects my personal performance of the history, physical exam, 

medical decision making, and the department course for this patient. I have also

 personally directed, reviewed, and agree with the discharge instructions and 

disposition.





Disposition





- Clinical Impression


Clinical Impression: 


 Shoulder pain








- Patient ED Disposition


Is Patient to be Admitted: No


Doctor Will See Patient In The: Office


Counseled Patient/Family Regarding: Studies Performed, Diagnosis, Need For 

Followup





- Disposition


Disposition: Routine/Home


Disposition Time: 06:17


Condition: GOOD


Additional Instructions: 





SARAHI FARMER, thank you for letting us take care of you today. Your provider was 

Antione Sheth MD and you were treated for CHEST PAIN. The emergency 

medical care you received today was directed at your acute symptoms. If you were

 prescribed any medication, please fill it and take as directed. It may take 

several days for your symptoms to resolve. Return to the Emergency Department if

 your symptoms worsen, do not improve, or if you have any other problems.





Please contact your doctor or call one of the physicians/clinics you have been 

referred to that are listed on the Patient Visit Information form that is 

included in your discharge packet. Bring any paperwork you were given at 

discharge with you along with any medications you are taking to your follow up 

visit. Our treatment cannot replace ongoing medical care by a primary care 

provider outside of the emergency department.





Thank you for allowing the [facility] team to be part of your care today.








If you had an X-Ray or CT scan: A Radiologist will review the ED reading if any 

change in treatment is needed we will contact you.***





If you had a blood, urine, or wound culture: It will take several days for the 

results, if any change in treatment is needed we will contact you.***





If you had an STI test: It will take 48 hours for the results. Please call after

 1 week if you have not heard back.***


Prescriptions: 


Cyclobenzaprine [Cyclobenzaprine HCl] 10 mg PO TID PRN #15 tab


 PRN Reason: Muscle Spasm


Naproxen [Naprosyn] 500 mg PO Q12 #20 tab


Instructions:  Shoulder Pain (DC)

## 2019-01-27 NOTE — CARD
--------------- APPROVED REPORT --------------





Date of service: 01/27/2019



EKG Measurement

Heart [id number]

VT [id number]

[id number]

[id number]

[id number]



<Conclusion>

Normal sinus rhythm

Normal ECG

## 2019-04-20 ENCOUNTER — HOSPITAL ENCOUNTER (EMERGENCY)
Dept: HOSPITAL 14 - H.ER | Age: 49
Discharge: HOME | End: 2019-04-20
Payer: SELF-PAY

## 2019-04-20 VITALS
OXYGEN SATURATION: 100 % | SYSTOLIC BLOOD PRESSURE: 139 MMHG | DIASTOLIC BLOOD PRESSURE: 76 MMHG | RESPIRATION RATE: 16 BRPM | HEART RATE: 72 BPM | TEMPERATURE: 98.1 F

## 2019-04-20 DIAGNOSIS — M17.11: ICD-10-CM

## 2019-04-20 DIAGNOSIS — M25.561: Primary | ICD-10-CM

## 2019-04-20 PROCEDURE — 81025 URINE PREGNANCY TEST: CPT

## 2019-04-20 PROCEDURE — 99284 EMERGENCY DEPT VISIT MOD MDM: CPT

## 2019-04-20 PROCEDURE — 96372 THER/PROPH/DIAG INJ SC/IM: CPT

## 2019-04-20 PROCEDURE — 73562 X-RAY EXAM OF KNEE 3: CPT

## 2019-04-21 NOTE — RAD
Date of service: 



04/20/2019



PROCEDURE:  Right Knee Radiographs.



HISTORY:

lateral knee pain



COMPARISON:

None.



TECHNIQUE:

Three views of the right knee were obtained.



FINDINGS:



BONES:

Normal. No fracture. 



JOINTS:

Minor medial compartment narrowing is noted with minimal spurring.  

On the patellar view there is mild lateral patellar tilt and/or 

subluxation.  No focal osteochondral defect or loose body is seen. 



JOINT EFFUSION:

None. 



OTHER FINDINGS:

None.



IMPRESSION:

No evidence of fracture or joint effusion.  Please see above.